# Patient Record
Sex: MALE | Race: BLACK OR AFRICAN AMERICAN | HISPANIC OR LATINO | Employment: UNEMPLOYED | URBAN - METROPOLITAN AREA
[De-identification: names, ages, dates, MRNs, and addresses within clinical notes are randomized per-mention and may not be internally consistent; named-entity substitution may affect disease eponyms.]

---

## 2017-02-20 ENCOUNTER — OFFICE VISIT - HEALTHEAST (OUTPATIENT)
Dept: FAMILY MEDICINE | Facility: CLINIC | Age: 42
End: 2017-02-20

## 2017-02-20 DIAGNOSIS — N40.0 BPH (BENIGN PROSTATIC HYPERPLASIA): ICD-10-CM

## 2017-02-20 DIAGNOSIS — R35.0 URINE FREQUENCY: ICD-10-CM

## 2017-02-20 LAB — PSA SERPL-MCNC: 1.6 NG/ML (ref 0–2.5)

## 2017-02-20 ASSESSMENT — MIFFLIN-ST. JEOR: SCORE: 1745.36

## 2017-03-15 ENCOUNTER — AMBULATORY - HEALTHEAST (OUTPATIENT)
Dept: FAMILY MEDICINE | Facility: CLINIC | Age: 42
End: 2017-03-15

## 2017-03-15 DIAGNOSIS — R31.29 MICROSCOPIC HEMATURIA: ICD-10-CM

## 2017-05-04 ENCOUNTER — COMMUNICATION - HEALTHEAST (OUTPATIENT)
Dept: FAMILY MEDICINE | Facility: CLINIC | Age: 42
End: 2017-05-04

## 2017-08-08 ENCOUNTER — COMMUNICATION - HEALTHEAST (OUTPATIENT)
Dept: FAMILY MEDICINE | Facility: CLINIC | Age: 42
End: 2017-08-08

## 2017-08-29 ENCOUNTER — OFFICE VISIT - HEALTHEAST (OUTPATIENT)
Dept: FAMILY MEDICINE | Facility: CLINIC | Age: 42
End: 2017-08-29

## 2017-08-29 DIAGNOSIS — R35.0 URINARY FREQUENCY: ICD-10-CM

## 2017-08-29 DIAGNOSIS — M54.50 ACUTE BILATERAL LOW BACK PAIN WITHOUT SCIATICA: ICD-10-CM

## 2017-08-29 ASSESSMENT — MIFFLIN-ST. JEOR: SCORE: 1751.03

## 2017-11-10 ENCOUNTER — COMMUNICATION - HEALTHEAST (OUTPATIENT)
Dept: FAMILY MEDICINE | Facility: CLINIC | Age: 42
End: 2017-11-10

## 2018-04-19 ENCOUNTER — RECORDS - HEALTHEAST (OUTPATIENT)
Dept: ADMINISTRATIVE | Facility: OTHER | Age: 43
End: 2018-04-19

## 2018-07-30 ENCOUNTER — OFFICE VISIT - HEALTHEAST (OUTPATIENT)
Dept: FAMILY MEDICINE | Facility: CLINIC | Age: 43
End: 2018-07-30

## 2018-07-30 DIAGNOSIS — R07.89 ATYPICAL CHEST PAIN: ICD-10-CM

## 2018-07-30 DIAGNOSIS — R53.83 FATIGUE, UNSPECIFIED TYPE: ICD-10-CM

## 2018-07-30 DIAGNOSIS — Z00.00 ROUTINE HISTORY AND PHYSICAL EXAMINATION OF ADULT: ICD-10-CM

## 2018-07-30 LAB
ERYTHROCYTE [DISTWIDTH] IN BLOOD BY AUTOMATED COUNT: 12.3 % (ref 11–14.5)
HCT VFR BLD AUTO: 43.3 % (ref 40–54)
HGB BLD-MCNC: 14.7 G/DL (ref 14–18)
MCH RBC QN AUTO: 25.9 PG (ref 27–34)
MCHC RBC AUTO-ENTMCNC: 34 G/DL (ref 32–36)
MCV RBC AUTO: 76 FL (ref 80–100)
PLATELET # BLD AUTO: 300 THOU/UL (ref 140–440)
PMV BLD AUTO: 7.2 FL (ref 7–10)
RBC # BLD AUTO: 5.67 MILL/UL (ref 4.4–6.2)
WBC: 7.7 THOU/UL (ref 4–11)

## 2018-07-30 ASSESSMENT — MIFFLIN-ST. JEOR: SCORE: 1754.43

## 2018-07-31 LAB
ALBUMIN SERPL-MCNC: 4.4 G/DL (ref 3.5–5)
ALP SERPL-CCNC: 86 U/L (ref 45–120)
ALT SERPL W P-5'-P-CCNC: 20 U/L (ref 0–45)
ANION GAP SERPL CALCULATED.3IONS-SCNC: 10 MMOL/L (ref 5–18)
AST SERPL W P-5'-P-CCNC: 16 U/L (ref 0–40)
BILIRUB SERPL-MCNC: 0.4 MG/DL (ref 0–1)
BUN SERPL-MCNC: 23 MG/DL (ref 8–22)
CALCIUM SERPL-MCNC: 10.3 MG/DL (ref 8.5–10.5)
CHLORIDE BLD-SCNC: 106 MMOL/L (ref 98–107)
CHOLEST SERPL-MCNC: 203 MG/DL
CO2 SERPL-SCNC: 25 MMOL/L (ref 22–31)
CREAT SERPL-MCNC: 1.28 MG/DL (ref 0.7–1.3)
FASTING STATUS PATIENT QL REPORTED: YES
GFR SERPL CREATININE-BSD FRML MDRD: >60 ML/MIN/1.73M2
GLUCOSE BLD-MCNC: 96 MG/DL (ref 70–125)
HDLC SERPL-MCNC: 48 MG/DL
LDLC SERPL CALC-MCNC: 115 MG/DL
POTASSIUM BLD-SCNC: 4.3 MMOL/L (ref 3.5–5)
PROT SERPL-MCNC: 7.8 G/DL (ref 6–8)
SODIUM SERPL-SCNC: 141 MMOL/L (ref 136–145)
TRIGL SERPL-MCNC: 200 MG/DL
TSH SERPL DL<=0.005 MIU/L-ACNC: 1.71 UIU/ML (ref 0.3–5)

## 2018-08-01 ENCOUNTER — COMMUNICATION - HEALTHEAST (OUTPATIENT)
Dept: FAMILY MEDICINE | Facility: CLINIC | Age: 43
End: 2018-08-01

## 2018-08-01 LAB — 25(OH)D3 SERPL-MCNC: 19.5 NG/ML (ref 30–80)

## 2018-10-09 ENCOUNTER — RECORDS - HEALTHEAST (OUTPATIENT)
Dept: ADMINISTRATIVE | Facility: OTHER | Age: 43
End: 2018-10-09

## 2019-01-11 ENCOUNTER — COMMUNICATION - HEALTHEAST (OUTPATIENT)
Dept: FAMILY MEDICINE | Facility: CLINIC | Age: 44
End: 2019-01-11

## 2019-02-05 ENCOUNTER — OFFICE VISIT - HEALTHEAST (OUTPATIENT)
Dept: FAMILY MEDICINE | Facility: CLINIC | Age: 44
End: 2019-02-05

## 2019-02-05 DIAGNOSIS — I10 ESSENTIAL HYPERTENSION: ICD-10-CM

## 2019-02-05 DIAGNOSIS — Z76.89 ENCOUNTER TO ESTABLISH CARE: ICD-10-CM

## 2019-02-05 ASSESSMENT — MIFFLIN-ST. JEOR: SCORE: 1754.43

## 2019-04-15 ENCOUNTER — COMMUNICATION - HEALTHEAST (OUTPATIENT)
Dept: FAMILY MEDICINE | Facility: CLINIC | Age: 44
End: 2019-04-15

## 2019-04-15 DIAGNOSIS — I10 ESSENTIAL HYPERTENSION: ICD-10-CM

## 2019-05-23 ENCOUNTER — OFFICE VISIT - HEALTHEAST (OUTPATIENT)
Dept: FAMILY MEDICINE | Facility: CLINIC | Age: 44
End: 2019-05-23

## 2019-05-23 DIAGNOSIS — S43.422A SPRAIN OF LEFT ROTATOR CUFF CAPSULE, INITIAL ENCOUNTER: ICD-10-CM

## 2019-05-23 DIAGNOSIS — I10 ESSENTIAL HYPERTENSION: ICD-10-CM

## 2019-05-23 LAB
CHOLEST SERPL-MCNC: 226 MG/DL
FASTING STATUS PATIENT QL REPORTED: NO
HDLC SERPL-MCNC: 56 MG/DL
LDLC SERPL CALC-MCNC: 151 MG/DL
TRIGL SERPL-MCNC: 93 MG/DL

## 2019-05-23 ASSESSMENT — MIFFLIN-ST. JEOR: SCORE: 1683.56

## 2019-05-29 ENCOUNTER — OFFICE VISIT - HEALTHEAST (OUTPATIENT)
Dept: PHYSICAL THERAPY | Facility: REHABILITATION | Age: 44
End: 2019-05-29

## 2019-05-29 DIAGNOSIS — M25.512 ACUTE PAIN OF LEFT SHOULDER: ICD-10-CM

## 2019-05-29 DIAGNOSIS — R29.3 ABNORMAL POSTURE: ICD-10-CM

## 2019-06-07 ENCOUNTER — OFFICE VISIT - HEALTHEAST (OUTPATIENT)
Dept: PHYSICAL THERAPY | Facility: REHABILITATION | Age: 44
End: 2019-06-07

## 2019-06-07 DIAGNOSIS — M25.512 ACUTE PAIN OF LEFT SHOULDER: ICD-10-CM

## 2019-06-07 DIAGNOSIS — R29.3 ABNORMAL POSTURE: ICD-10-CM

## 2019-06-14 ENCOUNTER — OFFICE VISIT - HEALTHEAST (OUTPATIENT)
Dept: PHYSICAL THERAPY | Facility: REHABILITATION | Age: 44
End: 2019-06-14

## 2019-06-14 DIAGNOSIS — R29.3 ABNORMAL POSTURE: ICD-10-CM

## 2019-06-14 DIAGNOSIS — M25.512 ACUTE PAIN OF LEFT SHOULDER: ICD-10-CM

## 2019-06-28 ENCOUNTER — OFFICE VISIT - HEALTHEAST (OUTPATIENT)
Dept: PHYSICAL THERAPY | Facility: REHABILITATION | Age: 44
End: 2019-06-28

## 2019-06-28 DIAGNOSIS — R29.3 ABNORMAL POSTURE: ICD-10-CM

## 2019-06-28 DIAGNOSIS — M25.512 ACUTE PAIN OF LEFT SHOULDER: ICD-10-CM

## 2020-02-21 ENCOUNTER — COMMUNICATION - HEALTHEAST (OUTPATIENT)
Dept: FAMILY MEDICINE | Facility: CLINIC | Age: 45
End: 2020-02-21

## 2020-02-21 DIAGNOSIS — I10 ESSENTIAL HYPERTENSION: ICD-10-CM

## 2020-04-09 ENCOUNTER — VIRTUAL VISIT (OUTPATIENT)
Dept: FAMILY MEDICINE | Facility: OTHER | Age: 45
End: 2020-04-09

## 2020-04-09 ENCOUNTER — COMMUNICATION - HEALTHEAST (OUTPATIENT)
Dept: SCHEDULING | Facility: CLINIC | Age: 45
End: 2020-04-09

## 2020-04-10 NOTE — PROGRESS NOTES
"Date: 2020 14:18:11  Clinician: Radha Hwang  Clinician NPI: [id number]  Patient: Gume Hauser  Patient : 1975  Patient Address: [address] [place] [id number]  Patient Phone: [phone]  Visit Protocol: URI  Patient Summary:  Gume is a 44 year old ( : 1975 ) male who initiated a Visit for COVID-19 (Coronavirus) evaluation and screening. When asked the question \"Please sign me up to receive news, health information and promotions. \", Gume responded \"No\".    Gume states his symptoms started gradually 3-6 days ago. After his symptoms started, they improved and then got worse again.   His symptoms consist of myalgia, a cough, malaise, chills, and diarrhea.   Symptom details   Cough: Gume coughs a few times an hour and his cough is not more bothersome at night. Phlegm does not come into his throat when he coughs. He does not believe his cough is caused by post-nasal drip.    Gume denies having rhinitis, facial pain or pressure, sore throat, nasal congestion, ear pain, vomiting, nausea, teeth pain, headache, fever, and wheezing. He also denies taking antibiotic medication for the symptoms and having recent facial or sinus surgery in the past 60 days. He is not experiencing dyspnea.   Precipitating events  He has not recently been exposed to someone with influenza. Gume has not been in close contact with any high risk individuals.   Pertinent COVID-19 (Coronavirus) information  Gume has not traveled internationally or to the areas where COVID-19 (Coronavirus) is widespread, including cruise ship travel in the last 14 days before the start of his symptoms.   Gume has not had a close contact with a laboratory-confirmed COVID-19 patient within 14 days of symptom onset. He also has not had a close contact with a suspected COVID-19 patient within 14 days of symptom onset.   Gume does not work or volunteer as " healthcare worker or a  and does not work or volunteer in a healthcare facility. He does not live with a healthcare worker.   Pertinent medical history  Gume needs a return to work/school note.   Weight: 185 lbs   Gume does not smoke or use smokeless tobacco.   Weight: 185 lbs    MEDICATIONS: amlodipine besylate (bulk), ALLERGIES: NKDA  Clinician Response:  Dear Gume,   Based on the information you have provided, you do have symptoms that are consistent with Coronavirus (COVID-19).  The coronavirus causes mild to severe respiratory illness with the most common symptoms including fever, cough and difficulty breathing. Unfortunately, many viruses cause similar symptoms and it can be difficult to distinguish between viruses, especially in mild cases, so we are presuming that anyone with cough or fever has coronavirus at this time.  Coronavirus/COVID-19 has reached the point of community spread in [place], meaning that we are finding the virus in people with no known exposure risk for franchesca the virus. Given the increasing commonness of coronavirus in the community we are no longer testing patients who are not critically ill.  If you are a health care worker, you should refer to your employee health office for instructions about testing and returning to work.  For everyone else who has cough or fever, you should assume you are infected with coronavirus. Since you will not be tested but have symptoms that may be consistent with coronavirus, the CDC recommends you stay in self-isolation until these three things have happened:    You have had no fever for at least 72 hours (that is three full days of no fever without the use of medicine that reduces fevers)    AND   Other symptoms have improved (for example, when your cough or shortness of breath have improved)   AND   At least 7 days have passed since your symptoms first appeared.   How to Isolate:   Isolate yourself at home.  Do  Not allow any visitors  Do Not go to work or school  Do Not go to Bahai,  centers, shopping, or other public places.  Do Not shake hands.  Avoid close contact with others (hugging, kissing).   Protect Others:   Cover Your Mouth and Nose with a mask, disposable tissue or wash cloth to avoid spreading germs to others.  Wash your hands and face frequently with soap and water.   We know it can be scary to hear that you might have COVID-19. Our team can help track your symptoms and make sure you are doing ok over the next two weeks using a program called [employer] to keep in touch. When you receive an email from [employer], please consider enrolling in our monitoring program. There is no cost to you for monitoring. Here is a URL where you can learn more: [email or web address]  Managing Symptoms:   At this time, we primarily recommend Tylenol (Acetaminophen) for fever or pain. If you have liver or kidney problems, contact your primary care provider for instructions on use of tylenol. Adults can take 650 mg (two 325 mg pills) by mouth every 4-6 hours as needed OR 1,000 mg (two 500 mg pills) every 8 hours as needed. MAXIMUM DAILY DOSE: 3,000mg. For children, refer to dosing on bottle based on age or weight.   If you develop significant shortness of breath that prevents you from doing normal activities, please call 911 or proceed to the nearest emergency room and alert them immediately that you have been in self-isolation for possible coronavirus.  If you have a higher risk medical condition such as cancer, heart failure, end stage renal disease on dialysis or have a transplant, please reach out to your specialist's clinic to advise them of your [facility] visit should you not improve within the next two days.   For more information about COVID19 and options for caring for yourself at home, please visit the CDC website at https://www.cdc.gov/coronavirus/2019-ncov/about/steps-when-sick.htmlFor more  options for care at [facility], please visit our website at [email or web address]    Diagnosis: Cough  Diagnosis ICD: R05  Addendum created: April 09 17:05:14, 2020 created by: Nick Craft body: I reviewed the e-visit and discussed the patient with the resident and agree with the resident's assessment and plan of care as documented.

## 2020-05-18 ENCOUNTER — COMMUNICATION - HEALTHEAST (OUTPATIENT)
Dept: SCHEDULING | Facility: CLINIC | Age: 45
End: 2020-05-18

## 2020-05-18 ENCOUNTER — RECORDS - HEALTHEAST (OUTPATIENT)
Dept: ADMINISTRATIVE | Facility: OTHER | Age: 45
End: 2020-05-18

## 2020-05-19 ENCOUNTER — COMMUNICATION - HEALTHEAST (OUTPATIENT)
Dept: SCHEDULING | Facility: CLINIC | Age: 45
End: 2020-05-19

## 2020-05-21 ENCOUNTER — COMMUNICATION - HEALTHEAST (OUTPATIENT)
Dept: FAMILY MEDICINE | Facility: CLINIC | Age: 45
End: 2020-05-21

## 2020-05-21 ENCOUNTER — RECORDS - HEALTHEAST (OUTPATIENT)
Dept: ADMINISTRATIVE | Facility: OTHER | Age: 45
End: 2020-05-21

## 2020-05-21 DIAGNOSIS — I10 ESSENTIAL HYPERTENSION: ICD-10-CM

## 2020-05-22 ENCOUNTER — RECORDS - HEALTHEAST (OUTPATIENT)
Dept: ADMINISTRATIVE | Facility: OTHER | Age: 45
End: 2020-05-22

## 2020-05-29 ENCOUNTER — OFFICE VISIT - HEALTHEAST (OUTPATIENT)
Dept: FAMILY MEDICINE | Facility: CLINIC | Age: 45
End: 2020-05-29

## 2020-05-29 DIAGNOSIS — I10 ESSENTIAL HYPERTENSION: ICD-10-CM

## 2020-05-29 DIAGNOSIS — R07.89 CHEST WALL PAIN: ICD-10-CM

## 2020-05-29 DIAGNOSIS — M53.3 COCCYDYNIA: ICD-10-CM

## 2020-06-02 ENCOUNTER — COMMUNICATION - HEALTHEAST (OUTPATIENT)
Dept: FAMILY MEDICINE | Facility: CLINIC | Age: 45
End: 2020-06-02

## 2020-07-26 ENCOUNTER — COMMUNICATION - HEALTHEAST (OUTPATIENT)
Dept: FAMILY MEDICINE | Facility: CLINIC | Age: 45
End: 2020-07-26

## 2020-07-26 DIAGNOSIS — I10 ESSENTIAL HYPERTENSION: ICD-10-CM

## 2020-07-29 ENCOUNTER — OFFICE VISIT - HEALTHEAST (OUTPATIENT)
Dept: FAMILY MEDICINE | Facility: CLINIC | Age: 45
End: 2020-07-29

## 2020-07-29 DIAGNOSIS — R00.0 TACHYCARDIA: ICD-10-CM

## 2020-08-14 ENCOUNTER — HOSPITAL ENCOUNTER (OUTPATIENT)
Dept: CARDIOLOGY | Facility: CLINIC | Age: 45
Discharge: HOME | End: 2020-08-14
Attending: FAMILY MEDICINE

## 2020-08-14 DIAGNOSIS — R00.0 TACHYCARDIA: ICD-10-CM

## 2021-01-21 ENCOUNTER — COMMUNICATION - HEALTHEAST (OUTPATIENT)
Dept: FAMILY MEDICINE | Facility: CLINIC | Age: 46
End: 2021-01-21

## 2021-01-21 DIAGNOSIS — I10 ESSENTIAL HYPERTENSION: ICD-10-CM

## 2021-04-25 ENCOUNTER — COMMUNICATION - HEALTHEAST (OUTPATIENT)
Dept: FAMILY MEDICINE | Facility: CLINIC | Age: 46
End: 2021-04-25

## 2021-04-25 DIAGNOSIS — I10 ESSENTIAL HYPERTENSION: ICD-10-CM

## 2021-05-06 ENCOUNTER — COMMUNICATION - HEALTHEAST (OUTPATIENT)
Dept: ADMINISTRATIVE | Facility: CLINIC | Age: 46
End: 2021-05-06

## 2021-05-06 DIAGNOSIS — I10 ESSENTIAL HYPERTENSION: ICD-10-CM

## 2021-05-07 RX ORDER — AMLODIPINE BESYLATE 10 MG/1
10 TABLET ORAL DAILY
Qty: 90 TABLET | Refills: 3 | Status: SHIPPED | OUTPATIENT
Start: 2021-05-07 | End: 2021-07-06

## 2021-05-19 ENCOUNTER — OFFICE VISIT - HEALTHEAST (OUTPATIENT)
Dept: FAMILY MEDICINE | Facility: CLINIC | Age: 46
End: 2021-05-19

## 2021-05-19 DIAGNOSIS — J02.0 STREP THROAT: ICD-10-CM

## 2021-05-19 LAB — DEPRECATED S PYO AG THROAT QL EIA: ABNORMAL

## 2021-05-27 VITALS
SYSTOLIC BLOOD PRESSURE: 122 MMHG | TEMPERATURE: 101.9 F | DIASTOLIC BLOOD PRESSURE: 60 MMHG | RESPIRATION RATE: 14 BRPM | HEART RATE: 101 BPM | OXYGEN SATURATION: 93 %

## 2021-05-27 NOTE — TELEPHONE ENCOUNTER
Refill Approved    Rx renewed per Medication Renewal Policy. Medication was last renewed on 1/14/19.    Ly Mares, [facility] Triage/Med Refill 4/17/2019     Requested Prescriptions   Pending Prescriptions Disp Refills     amLODIPine (NORVASC) 10 MG tablet [Pharmacy Med Name: AMLODIPINE BESYLATE 10MG TABLETS] 90 tablet 0     Sig: TAKE ONE TABLET BY MOUTH DAILY       Calcium-Channel Blockers Protocol Passed - 4/15/2019  9:24 AM        Passed - PCP or prescribing provider visit in past 12 months or next 3 months     Last office visit with prescriber/PCP: Visit date not found OR same dept: 2/5/2019 Ketan Patel MD OR same specialty: 2/5/2019 Ketan Patel MD  Last physical: Visit date not found Last MTM visit: Visit date not found   Next visit within 3 mo: Visit date not found  Next physical within 3 mo: Visit date not found  Prescriber OR PCP: Shi Somers MD  Last diagnosis associated with med order: There are no diagnoses linked to this encounter.  If protocol passes may refill for 12 months if within 3 months of last provider visit (or a total of 15 months).             Passed - Blood pressure filed in past 12 months     BP Readings from Last 1 Encounters:   02/05/19 130/86

## 2021-05-29 NOTE — PROGRESS NOTES
[facility] Daily Progress     Patient Name: Gume Hauser  Date: 2019  Visit #: 3  Referral Diagnosis: L shoulder pain  Referring provider: Ketan Patel MD  Visit Diagnosis:     ICD-10-CM    1. Acute pain of left shoulder M25.512    2. Abnormal posture R29.3          Assessment:   Patient presenting with improving L shoulder pain and good toleration for HEP. Patient continued with the manual therapy to address tension in the L bicep and pec major.  PT to consider independent hoe trial following next appointment if patient continues to improve at this rate.    HEP/POC compliance is  good .  Patient is appropriate to continue with skilled physical therapy intervention, as indicated by initial plan of care.    Goal Status:  Pt. will be independent with home exercise program in : 4 weeks    Pt will: be able to assume R SL without increased L shoulder pain in 8 weeks  Pt will: reduce mild soreness to 0/10 post work shift in 8 weeks  Pt will: reduce SPADI by 30% in 8 weeks      Plan / Patient Education:     Continue with initial plan of care.  Progress with home program as tolerated.    PLAN for next visit: review GB GH ext exercise, review HEP, MT, possible last appointment  Subjective:     Pain Ratin    The patient reports that he has been doing well with the HEP and has been doing better even with a lot going on recently (Graduations, party, family events, etc.) His shoulder has not been bothering him. He reports that he is comfortable doing a follow-up visit in 2 weeks and assess the plan at that point.       Objective:   PT instructed the patient on Green band shoulder extension exercise  Using tactile and verbal cues. PT performed MT (see flowsheet).    Exercises:  Exercise #1: scapular retraction x 10 hold 5 seconds  Comment #1: L GH isometrics 1) flexion 2) IR x 10 hold 5 seconds  Exercise #2: Standing unilateral pec stretch against wall, hold 30 seconds per side  Comment #2: Greenband  shoulder extension, x10-30, 1x per day    ROM:  L Shoulder Flexion: 168 degrees (was 155)  Treatment Today     TREATMENT MINUTES COMMENTS   Evaluation     Self-care/ Home management     Manual therapy 17 Patient positioned in supine- MFR layer III to L bicep and L subscapularis and L pec major and L UT   Neuromuscular Re-education     Therapeutic Activity     Therapeutic Exercises 8 See flow sheet  Added GB GH extension   Gait training     Modality__________________                Total 25    Blank areas are intentional and mean the treatment did not include these items.     PT  present for and directed all treatment and not engaged in treating other patients for the duration of this appointment.    Savita Thapa, SPT  Jenny Wahl  6/14/2019

## 2021-05-29 NOTE — PROGRESS NOTES
[facility] Daily Progress     Patient Name: Gume Hauser  Date: 2019  Visit #: 2  Referral Diagnosis: L shoulder pain  Referring provider: Ketan Patel MD  Visit Diagnosis:     ICD-10-CM    1. Acute pain of left shoulder M25.512    2. Abnormal posture R29.3          Assessment:   Patient presenting with improving L shoulder pain and good toleration for HEP. Patient continued with the manual therapy to address tension in the L bicep and pec major.    HEP/POC compliance is  good .  Patient is appropriate to continue with skilled physical therapy intervention, as indicated by initial plan of care.    Goal Status:  Pt. will be independent with home exercise program in : 4 weeks    Pt will: be able to assume R SL without increased L shoulder pain in 8 weeks  Pt will: reduce mild soreness to 0/10 post work shift in 8 weeks  Pt will: reduce SPADI by 30% in 8 weeks      Plan / Patient Education:     Continue with initial plan of care.  Progress with home program as tolerated.    PLAN for next visit: add resistance to scapular retraction exercise,   Subjective:     Pain Ratin    The patient reports that he missed one day of exercise but has been pretty consistent with the HEP. He has been very busy at work and home; his daughter is graduating today and they have a party this weekend. Yesterday he did a lot of physical work and said it went well.      Objective:     Exercises:  Exercise #1: scapular retraction x 10 hold 5 seconds  Comment #1: L GH isometrics 1) flexion 2) IR x 10 hold 5 seconds  Exercise #2: Standing unilateral pec stretch against wall, hold 30 seconds per side    ROM:  L Shoulder Flexion: 168 degrees (was 155)  Treatment Today     TREATMENT MINUTES COMMENTS   Evaluation     Self-care/ Home management     Manual therapy 15 Patient positioned in supine- MFR layer III to L bicep and L subscapularis and L pec major and L UT   Neuromuscular Re-education     Therapeutic Activity      Therapeutic Exercises 10 See flow sheet   Gait training     Modality__________________                Total 25    Blank areas are intentional and mean the treatment did not include these items.     Savita Thapa, [facility]  Jenny Wahl  6/7/2019

## 2021-05-29 NOTE — PROGRESS NOTES
ASSESSMENT AND PLAN:  1. Essential hypertension  Blood pressure well controlled taking amlodipine check a lipid panel today.  - Lipid Cascade    2. Sprain of left rotator cuff capsule, initial encounter    He has had left rotator cuff discomfort.  He will purchase Biofreeze Celebrex started he will go to physical therapy.  - Ambulatory referral to Adult PT- External  - celecoxib (CELEBREX) 200 MG capsule; Take 1 capsule (200 mg total) by mouth daily.  Dispense: 30 capsule; Refill: 2            Orders Placed This Encounter   Procedures     Lipid Cascade     Order Specific Question:   Fasting is required?     Answer:   No     Ambulatory referral to Adult PT- External     Referral Priority:   Routine     Referral Type:   Physical Therapy     Referral Reason:   Evaluation and Treatment     Requested Specialty:   Physical Therapy     Number of Visits Requested:   1     There are no discontinued medications.    No follow-ups on file.    CHIEF COMPLAINT:  Chief Complaint   Patient presents with     Pain     Left arm and shoulder.  feels tingling       HISTORY OF PRESENT ILLNESS:  Gume is a 43 y.o. male who presents to the clinic today for left shoulder pain. Onset was sudden without inciting event. There was no associated trauma or injury. There is some tingling involved. It is exacerbated with certain positions. It hurts to sleep on his left side. He is left-handed, and does not do any lifting at work. The patient denies any chest pain or discomfort. He has not come in fasting today.     REVIEW OF SYSTEMS:   CV: No chest pain or discomfort.  Musculoskeletal: Left shoulder pain.  Neuro: Tingling in left shoulder.   All other systems are negative.    PFSH:  His job does not require any lifting. Reviewed as below.     TOBACCO USE:  Social History     Tobacco Use   Smoking Status Never Smoker   Smokeless Tobacco Never Used       VITALS:  Vitals:    05/23/19 1427   BP: 134/82   Patient Site: Left Arm   Patient Position:  "Sitting   Cuff Size: Adult Regular   Pulse: 86   Resp: 18   Temp: 98.3  F (36.8  C)   TempSrc: Oral   SpO2: 98%   Weight: 187 lb 6 oz (85 kg)   Height: 5' 7\" (1.702 m)     Wt Readings from Last 3 Encounters:   05/23/19 187 lb 6 oz (85 kg)   02/05/19 203 lb (92.1 kg)   07/30/18 203 lb (92.1 kg)     Body mass index is 29.35 kg/m .      PHYSICAL EXAM:  General: Alert, cooperative, no distress, appears stated age  HEENT: Normocephalic, without obvious abnormality, atraumatic, moist mucous membranes   Eyes: PERRL, conjunctiva/cornea clear, EOM's intact  Lungs: Clear to auscultation bilaterally, respirations unlabored  Heart: Regular rate and rhythm, S1 and S2 normal, no murmur, rub, or gallop  Musculoskeletal: On evaluation of the left shoulder, he is tender to palpation over the tendon of the pectoralis major and teres minor. Positive Neer's.  Neurologic:  A & O x 3.  No tremor, no focal findings.  Strength testing is normal and symetric both proximally and distally BUE.  Normal gait.     DATA REVIEWED:  Additional History from Old Records Summarized (2): none  Decision to Obtain Records (1): none  Radiology Tests Summarized or Ordered (1): none  Labs Reviewed or Ordered (1): Lipid cascade ordered.  Medicine Test Summarized or Ordered (1): none  Independent Review of EKG or X-RAY(2 each): none      ITerra, am scribing for and in the presence of, Dr. Patel.    IDr. Patel, personally performed the services described in this documentation, as scribed by Terra Kwan in my presence, and it is both accurate and complete.      MEDICATIONS:  Current Outpatient Medications   Medication Sig Dispense Refill     amLODIPine (NORVASC) 10 MG tablet TAKE ONE TABLET BY MOUTH DAILY 90 tablet 2     celecoxib (CELEBREX) 200 MG capsule Take 1 capsule (200 mg total) by mouth daily. 30 capsule 2     No current facility-administered medications for this visit.      Total amount time spent the patient today was 25 minutes greater than " 50% of this time was spent discussing the pathophysiology of rotator cuff injuries and impingement.  Total Data Points: 1    Please note that this clinical encounter uses voice recognition software, there may be typographical errors present

## 2021-05-29 NOTE — PROGRESS NOTES
[facility]   Shoulder Initial Evaluation    Patient Name: Gume Hauser  Date of evaluation: 5/29/2019  Referral Diagnosis: Sprain of left rotator cuff capsule, initial encounter  Referring provider: Ketan Patel MD  Visit Diagnosis:     ICD-10-CM    1. Acute pain of left shoulder M25.512    2. Abnormal posture R29.3        Assessment:    Patient is presenting with insidious onset L shoulder pain that began two weeks ago, 5/2019. He notes some soreness (mild) at the end of his day after lifting 10-40# throughout the day. He denies any previous history of injury. PT exam reveals positive Delaney-Reos, irritation with L bicep and L subscapularis testing. PT educated the patient on findings and PT treatment and proceed with  UR mobilization. PT also initiated HEP to address deficits. PT established goals in agreement with the patient.     Pt. is appropriate for skilled PT intervention as outlined in the Plan of Care (POC).  Pt. is a good candidate for skilled PT services to improve pain levels and function.    Goals:  Pt. will be independent with home exercise program in : 4 weeks    Pt will: be able to assume R SL without increased L shoulder pain in 8 weeks  Pt will: reduce mild soreness to 0/10 post work shift in 8 weeks  Pt will: reduce SPADI by 30% in 8 weeks      Patient's expectations/goals are realistic.    Barriers to Learning or Achieving Goals:  No Barriers.       Plan / Patient Instructions:        Plan of Care:   Communication with: Referral Source  Patient Related Instruction: Nature of Condition;Treatment plan and rationale;Posture  Times per Week: 1  Number of Weeks: 12  Number of Visits: 12  Discharge Planning: to independent home program and self-care  Therapeutic Exercise: Strengthening;Stretching;ROM  Neuromuscular Reeducation: posture;kinesio tape  Manual Therapy: soft tissue mobilization;myofascial release;joint mobilization  Modalities: TENS      POC and pathology of condition  were reviewed with patient.  Pt. is in agreement with the Plan of Care  A Home Exercise Program (HEP) was initiated today.  Plan for next visit: assess response to manual therapy, unilateral pectoral stretch  .   Subjective:         History of Present Illness:    Gume is a 43 y.o. male who presents to therapy today with complaints of L shoulder pain that's started 2 weeks ago insidiously. He noted increased pain with helping a friend to move  He notes it to worsen a little with work the more he does. He reports some discomfort with lying on his side at night.   Daily he lifts about 10-40#.  He has a 2# and 5#.     Better with: rest  Worse with: Lying on side, rep lifting at work.    Pain Ratin  Pain rating at best: 0  Pain rating at worst: 6 after lifting  Pain description: aching and soreness    Functional limitations are described as occurring with:   lifting    Patient reports benefit from:  rest         Objective:      Note: Items left blank indicates the item was not performed or not indicated at the time of the evaluation.    Patient Outcome Measures :    Shoulder Pain and Disability Index (SPADI) in %: 16     Scores range from 0-100%, where a score of 0% represents minimal pain and maximal function. The minimal clinically important difference is a score reduction of 10%.    Shoulder Examination  1. Acute pain of left shoulder     2. Abnormal posture       Involved side: Left  Posture Observation:      Shoulder/Thoracic complex: Moderate bilateral scapular protraction   Cervical Clearing: Normal    Shoulder/Elbow ROM    Date: 19     Shoulder and Elbow ROM ( )   AROM in degrees AROM in degrees AROM in degrees    Right Left Right Left Right Left   Shoulder Flexion (0-180 ) 178 155       Shoulder Abduction (0-180 ) 180 WNL       Shoulder Extension (0-60 )         Shoulder ER (0-90 ) WNL WNL       Shoulder IR (0-70 )         Shoulder IR/Ext Thumb to T3 Thumb to T 12       Elbow Flexion (150 )          Elbow Extension (0 )           Shoulder/Elbow Strength   Date: 5/29/19     Shoulder/Elbow Strength (/5)  Manual Muscle Test (MMT) MMT MMT MMT    Right Left Right Left Right Left   Shoulder Flexion 5 5       Supraspinatus 5 4+       Shoulder Abduction 5 4+ p       Shoulder Extension         Shoulder External Rotation 5 5       Shoulder Internal Rotation 5 4- p       Elbow Flexion 5 5       Elbow Extension 5 5       Other:         Other:           Flexibility:     Palpation: L bicep    Joint Assessment:  Sternoclavicular Joint Assessment: WNL  Acromioclavicular Joint Assessment: Hypomobile   Scapulothoracic Joint Assessment: Hypomobile.  Glenohumeral Joint Assessment:Hypomobile.    Shoulder Special Tests     Impingement Cluster Right (+/-) Left (+/-) Rotator Cuff Tests Right (+/-) Left (+/-)   Delaney-Eros  + Drop Arm Sign  -   Painful Arc  - Hornblowers     Infraspinatus Test  - ERLS     AC Tests Right (+/-) Left (+/-) IRLS     Active Compression  + Labral Tests Right (+/-) Left (+/-)   Crossbody Adduction   Biceps Load Test II  +   AC Resisted Extension   Jerk Test     GH Instability Tests Right (+/-) Left (+/-) Jolly Test     Sulcus Sign   Biceps Tests Right (+/-) Left (+/-)   Apprehension   Speed  -   Relocation   Mar hoyos     Other:   Other:     Other:   Other:         Treatment Today    TREATMENT MINUTES COMMENTS   Evaluation 20 Low complexity   Self-care/ Home management     Manual therapy 15 Patient positioned in supine- MFR layer III to L bicep and L subscapularis and L pec major   Neuromuscular Re-education     Therapeutic Activity     Therapeutic Exercises 15 See flow sheet  PT educated patient on eval findings and PT POC.    Gait training     Modality__________________                Total 50    Blank areas are intentional and mean the treatment did not include these items.     PT Evaluation Code: (Please list factors)  Patient History/Comorbidities:   Patient Active Problem List   Diagnosis     Joint  Pain, Localized In The Hip     Tingling (Paresthesia)     Limb Pain     Bladder Pain     Microscopic Hematuria     Hypertension       Examination: shoulder  Clinical Presentation: stable  Clinical Decision Making: low    Patient History/  Comorbidities Examination  (body structures and functions, activity limitations, and/or participation restrictions) Clinical Presentation Clinical Decision Making (Complexity)   No documented Comorbidities or personal factors 1-2 Elements Stable and/or uncomplicated Low   1-2 documented comorbidities or personal factor 3 Elements Evolving clinical presentation with changing characteristics Moderate   3-4 documented comorbidities or personal factors 4 or more Unstable and unpredictable High                Jenny Wahl  5/29/2019  11:45 AM

## 2021-05-30 VITALS — HEIGHT: 67 IN | WEIGHT: 201 LBS | BODY MASS INDEX: 31.55 KG/M2

## 2021-05-30 NOTE — PROGRESS NOTES
[facility] Discharge Summary  Patient Name: Gume Hauser  Date: 10/18/2019  Referral Diagnosis: L shoulder pain  Referring provider: Ketan Patel MD  Visit Diagnosis:   1. Acute pain of left shoulder     2. Abnormal posture         Goals:  No data recorded  No data recorded    Patient was seen for 4 visits from 5/29/19 to 6/28/19 with 0 missed appointments.  The patient met goals and has demonstrated understanding of and independence in the home program for self-care, and progression to next steps.  He will initiate contact if questions or concerns arise.    Therapy will be discontinued at this time.  The patient will need a new referral to resume.    Thank you for your referral.  Jenny Wahl  10/18/2019  9:22 AM  [facility] Daily Progress     Patient Name: Gume Hauser  Date: 6/28/2019  Visit #: 4  Referral Diagnosis: L shoulder pain  Referring provider: Ketan Patel MD  Visit Diagnosis:     ICD-10-CM    1. Acute pain of left shoulder M25.512    2. Abnormal posture R29.3          Assessment:   Patient is presenting with 0/10 L shoulder pain and complete achievement of goals status. PT reviewed SPADI and special testing of the L UE. His SPADI score has improved from 16% down to 0%. Patient and PT both agree to an independent home trial. PT explained DC process and instructed the patient to call if he has any questions.     HEP/POC compliance is  good .  Patient is appropriate to continue with skilled physical therapy intervention, as indicated by initial plan of care.    Goal Status:  Pt. will be independent with home exercise program in : 4 weeks;Met    Pt will: be able to assume R SL without increased L shoulder pain in 8 weeks- Met  Pt will: reduce mild soreness to 0/10 post work shift in 8 weeks- Met  Pt will: reduce SPADI by 30% in 8 weeks- Met      Plan / Patient Education:     Continue with initial plan of care.  Progress with home program as tolerated.    PLAN for next  visit: review HEP, MT, possible last appointment  Subjective:     Pain Ratin    The patient reports that he has been doing very well. He recently just got back from vacation in [place] and the shoulder did not bother him at all during vacation. He is able to sleep on it too and it does not bother him. He was a little sore after the manual therapy last time but it only lasted that night and it was gone by the next morning.    Objective:   PT instructed the patient on wall push-up using tactile and verbal cues and added it to the HEP. PT recommended he continue doing the exercises through the month of July then to do as he deems necessary. PT and patient discussed and agreed discharge. PT performed MT (see flowsheet).    Exercises:  Exercise #1: scapular retraction x 10 hold 5 seconds  Comment #1: L GH isometrics 1) flexion 2) IR x 10 hold 5 seconds  Exercise #2: Standing unilateral pec stretch against wall, hold 30 seconds per side  Comment #2: [place] shoulder extension, x10-30, 1x per day (verbally reviewed)  Exercise #3: Wall push up ,10-30x, 1x per day    ROM:  L Shoulder Flexion: 180 degrees (was 155)  Bicep load test is now negative and supraspinatus MMT on the L is no longer painful.    Treatment Today     TREATMENT MINUTES COMMENTS   Evaluation     Self-care/ Home management     Manual therapy 15 Patient positioned in supine- MFR layer III to L bicep and L subscapularis and L pec major and L UT   Neuromuscular Re-education     Therapeutic Activity     Therapeutic Exercises 12 See flow sheet  Added wall push-up.   Gait training     Modality__________________                Total 27    Blank areas are intentional and mean the treatment did not include these items.     PT  present for and directed all treatment and not engaged in treating other patients for the duration of this appointment.    Savita Thapa, SPT  Jenny Wahl  2019

## 2021-05-31 VITALS — BODY MASS INDEX: 31.74 KG/M2 | HEIGHT: 67 IN | WEIGHT: 202.25 LBS

## 2021-06-01 VITALS — WEIGHT: 203 LBS | BODY MASS INDEX: 31.86 KG/M2 | HEIGHT: 67 IN

## 2021-06-02 VITALS — BODY MASS INDEX: 31.86 KG/M2 | HEIGHT: 67 IN | WEIGHT: 203 LBS

## 2021-06-03 VITALS — BODY MASS INDEX: 29.41 KG/M2 | WEIGHT: 187.38 LBS | HEIGHT: 67 IN

## 2021-06-06 NOTE — TELEPHONE ENCOUNTER
Refill Approved    Rx renewed per Medication Renewal Policy. Medication was last renewed on 4/17/19.    Ly Mares, [facility] Connection Triage/Med Refill 2/24/2020     Requested Prescriptions   Pending Prescriptions Disp Refills     amLODIPine (NORVASC) 10 MG tablet [Pharmacy Med Name: AMLODIPINE BESYLATE 10MG TABLETS] 90 tablet 2     Sig: TAKE 1 TABLET BY MOUTH EVERY DAY       Calcium-Channel Blockers Protocol Passed - 2/21/2020 11:23 AM        Passed - PCP or prescribing provider visit in past 12 months or next 3 months     Last office visit with prescriber/PCP: Visit date not found OR same dept: 5/23/2019 Ketan Patel MD OR same specialty: 5/23/2019 Ketan Patel MD  Last physical: Visit date not found Last MTM visit: Visit date not found   Next visit within 3 mo: Visit date not found  Next physical within 3 mo: Visit date not found  Prescriber OR PCP: Shi Somers MD  Last diagnosis associated with med order: 1. Essential hypertension  - amLODIPine (NORVASC) 10 MG tablet [Pharmacy Med Name: AMLODIPINE BESYLATE 10MG TABLETS]; TAKE 1 TABLET BY MOUTH EVERY DAY  Dispense: 90 tablet; Refill: 2    If protocol passes may refill for 12 months if within 3 months of last provider visit (or a total of 15 months).             Passed - Blood pressure filed in past 12 months     BP Readings from Last 1 Encounters:   05/23/19 134/82

## 2021-06-07 NOTE — TELEPHONE ENCOUNTER
Sunday so tired arms tired, no shortness of breath climbing stairs. No fever. Cough dry. Fatigue is worse, no chest  Pain but chest tightness.   Tayler Trevizo  COVID 19 Nurse Triage Plan/Patient Instructions    Please be aware that novel coronavirus (COVID-19) may be circulating in the community. If you develop symptoms such as fever, cough, or SOB or if you have concerns about the presence of another infection including coronavirus (COVID-19), please contact your health care provider or visit [email or web address].     Disposition/Instructions    Patient to have an OnCare Visit with a provider (Preferred option). Follow System Ambulatory Workflow for COVID 19. It is recommended that you setup an OnCare Visit with one of our virtual providers.  To do this follow these instructions:    1. Go to the website [email or web address]  2. Create an account (you will need your insurance information)  3. Start a new visit  4. Choose your diagnosis (e.g. COVID19)  5. Fill out the information about your symptoms  6. A provider will reach out to you by text, phone call or video visit based on your request    Call Back If: Your symptoms worsen before you are able to complete your OnCare Visit with a provider.    Thank you for limiting contact with others, wearing a simple mask to cover your cough, practice good hand hygiene habits and accessing our virtual services where possible to limit the spread of this virus.    For more information about COVID19 and options for caring for yourself at home, please visit the CDC website at https://www.cdc.gov/coronavirus/2019-ncov/about/steps-when-sick.html  For more options for care at [facility], please visit our website at [email or web address]    For more information, please use the [place] Department of Health ([facility]) COVID-19 Hotlines (Interpreters available):     Health questions: Phone Number: [phone] or [phone] and Hours: 7 a.m. to 7 p.m.    Schools  and  questions: Phone Number: [phone] or [phone] and Hours 7 a.m. to 7 p.m.

## 2021-06-08 NOTE — TELEPHONE ENCOUNTER
Due to be seen    Rx renewed per Medication Renewal Policy. Medication was last renewed on 2/24/20.    Ly Mares, [facility] Connection Triage/Med Refill 5/26/2020     Requested Prescriptions   Pending Prescriptions Disp Refills     amLODIPine (NORVASC) 10 MG tablet [Pharmacy Med Name: AMLODIPINE BESYLATE 10MG TABLETS] 90 tablet 0     Sig: TAKE 1 TABLET BY MOUTH EVERY DAY       Calcium-Channel Blockers Protocol Passed - 5/21/2020 12:11 PM        Passed - PCP or prescribing provider visit in past 12 months or next 3 months     Last office visit with prescriber/PCP: Visit date not found OR same dept: 5/23/2019 Ketan Patel MD OR same specialty: 5/23/2019 Ketan Patel MD  Last physical: Visit date not found Last MTM visit: Visit date not found   Next visit within 3 mo: Visit date not found  Next physical within 3 mo: Visit date not found  Prescriber OR PCP: Shi Somers MD  Last diagnosis associated with med order: 1. Essential hypertension  - amLODIPine (NORVASC) 10 MG tablet [Pharmacy Med Name: AMLODIPINE BESYLATE 10MG TABLETS]; TAKE 1 TABLET BY MOUTH EVERY DAY  Dispense: 90 tablet; Refill: 0    If protocol passes may refill for 12 months if within 3 months of last provider visit (or a total of 15 months).             Passed - Blood pressure filed in past 12 months     BP Readings from Last 1 Encounters:   05/23/19 134/82

## 2021-06-08 NOTE — TELEPHONE ENCOUNTER
Son possibly has COVID-19, did on-care and was advised to self quarantine. Last exposure on Thursday, his sons symptoms began on Saturday.   Pt is wondering if he needs to quarantine due to possible exposure to COVID 19.    RN advised patient of recommendations per care advice/protocol.     Pt stated understanding and agreed to plan.     Bettina Hensley, RN   [facility] RN Triage      Reason for Disposition    [1] COVID-19 EXPOSURE (Close Contact) within last 14 days AND [2] NO cough, fever, or breathing difficulty    Protocols used: CORONAVIRUS (COVID-19) EXPOSURE-A- 4.22.20

## 2021-06-08 NOTE — TELEPHONE ENCOUNTER
RN Triage  Rolando is calling today because Saturday night he felt some indigestion symptoms into his chest. 3-4/10. He can still feel some of this now. He has had angina in the past. Never had a heart attack. History of hypertension.    I advised Rolando initially that he should call 911, he would prefer to go to ER or UR. He understands to call EMS if his symptoms worsen at all.      Reason for Disposition    Chest pain lasting longer than 5 minutes and ANY of the following:* Over 50 years old* Over 30 years old and at least one cardiac risk factor (i.e., high blood pressure, diabetes, high cholesterol, obesity, smoker or strong family history of heart disease)* Pain is crushing, pressure-like, or heavy * Took nitroglycerin and chest pain was not relieved* History of heart disease (i.e., angina, heart attack, bypass surgery, angioplasty, CHF)    Protocols used: CHEST PAIN-A-[place]    Sabra Zayas RN  [facility] Nurse Advisor    COVID 19 Nurse Triage Plan/Patient Instructions    Please be aware that novel coronavirus (COVID-19) may be circulating in the community. If you develop symptoms such as fever, cough, or SOB or if you have concerns about the presence of another infection including coronavirus (COVID-19), please contact your health care provider or visit [email or web address].     Disposition/Instructions    Patient to go to ED and follow protocol based instructions. Follow System Ambulatory Workflow for COVID 19.     Bring Your Own Device:  Please also bring your smart device(s) (smart phones, tablets, laptops) and their charging cables for your personal use and to communicate with your care team during your visit.   and Patient to call EMS/911 and follow protocol based instructions. Follow System Ambulatory Workflow for COVID 19.     Bring Your Own Device:  Please also bring your smart device(s) (smart phones, tablets, laptops) and their charging cables for your personal use and to communicate with your care team  during your visit.      Thank you for limiting contact with others, wearing a simple mask to cover your cough, practice good hand hygiene habits and accessing our virtual services where possible to limit the spread of this virus.    For more information about COVID19 and options for caring for yourself at home, please visit the CDC website at https://www.cdc.gov/coronavirus/2019-ncov/about/steps-when-sick.html  For more options for care at [facility], please visit our website at [email or web address]    For more information, please use the [place] Department of Health COVID-19 Website: https://www.health.[facility].[place]/diseases/coronavirus/index.html  [place] Department of Health ([facility]) COVID-19 Hotlines (Interpreters available):      Health questions: Phone Number: [phone] or [phone] and Hours: 7 a.m. to 7 p.m.    Schools and  questions: Phone Number: [phone] or [phone] and Hours 7 a.m. to 7 p.m.

## 2021-06-08 NOTE — PROGRESS NOTES
"Gume Hauser is a 44 y.o. male who is being evaluated via a billable telephone visit.      The patient has been notified of following:     \"This telephone visit will be conducted via a call between you and your physician/provider. We have found that certain health care needs can be provided without the need for a physical exam.  This service lets us provide the care you need with a short phone conversation.  If a prescription is necessary we can send it directly to your pharmacy.  If lab work is needed we can place an order for that and you can then stop by our lab to have the test done at a later time.    Telephone visits are billed at different rates depending on your insurance coverage. During this emergency period, for some insurers they may be billed the same as an in-person visit.  Please reach out to your insurance provider with any questions.    If during the course of the call the physician/provider feels a telephone visit is not appropriate, you will not be charged for this service.\"    Patient has given verbal consent to a Telephone visit? Yes    What phone number would you like to be contacted at? [phone]    Patient would like to receive their AVS by mail    Additional provider notes:     44-year-old male following up via phone visit today for low back pain is been present for approximately 4 days.  The pain is located in his \"and \"tailbone.  It is worse when he bends over.  He denies any heavy lifting.  He denies the pain increasing he describes it as a thr at work which does not pain.  He is tried using a warm heating pad which helps.  He does a significant amount of walking which does not make the pain worse.    Patient's medical history is significant for hypertension recent chest pain.  He was evaluated at [facility] 1 week ago.  Test results including an echo stress test were found to be unremarkable he has not had a recurrence of chest pain.    Assessment/Plan:  1. " Coccydynia  Starting Voltaren for the patient he understands how to take the medication he will use a warm pack and an ice pack he will continue to keep active as her symptoms remain the same up till next week he will call me again  - diclofenac (VOLTAREN) 75 MG EC tablet; Take 1 tablet (75 mg total) by mouth 2 (two) times a day.  Dispense: 40 tablet; Refill: 0    2. Essential hypertension  Blood pressures been well controlled he is been taking his amlodipine faithfully no dizziness has been noted.    3. Chest wall pain  He was advised to take an aspirin daily.  He was found to have slightly elevated cholesterol.  Troponin test was negative all lab tests including medical studies and the echo stress were discussed with the patient today.        Phone call duration:  25 minutes    Ketan an md

## 2021-06-09 NOTE — PROGRESS NOTES
Subjective:   Gume presents because of some increased voiding at nighttime.  He really does not have too much problem during the day.  He has noticed a little bit of decrease in the strength of his stream.  He denies dysuria.  He denies any change in character or color of the urine.  He has had symptoms over the last 2 months or so.      Objective:  Abdomen: Abdomen is soft and nontender.  Genitalia: No masses noted.  Testicles are normal and scrotum.  No urethral irritation noted.  Rectal: Prostate is enlarged symmetrically.  Is nontender.      Assessment:  1.  BPH      Plan:  We will start tamsulosin 0.4 mg daily.  I instructed the patient in use of this.  If this is of no benefit he will see the urologist for further evaluation.  Diagnostic PSA and urinalysis is done today.

## 2021-06-10 NOTE — PROGRESS NOTES
"Gume Hauser is a 45 y.o. male who is being evaluated via a billable telephone visit.      The patient has been notified of following:     \"This telephone visit will be conducted via a call between you and your physician/provider. We have found that certain health care needs can be provided without the need for a physical exam.  This service lets us provide the care you need with a short phone conversation.  If a prescription is necessary we can send it directly to your pharmacy.  If lab work is needed we can place an order for that and you can then stop by our lab to have the test done at a later time.    Telephone visits are billed at different rates depending on your insurance coverage. During this emergency period, for some insurers they may be billed the same as an in-person visit.  Please reach out to your insurance provider with any questions.    If during the course of the call the physician/provider feels a telephone visit is not appropriate, you will not be charged for this service.\"    Patient has given verbal consent to a Telephone visit? Yes    What phone number would you like to be contacted at? [phone]    Patient would like to receive their AVS by AVS Preference: Mail a copy.    Additional provider notes:     45-year-old male following up for hypertension and episodes of perceived tachycardia.  Patient reports that on 3 separate occasions she is noticed that his heart is beating fast.  He states that this is not related to exertion and he feels a slight headache when the episode occurs.  He has had 3 episodes as mentioned that have occurred within the last 2 weeks.  He also has evidence of this because his smart watch registers an elevated pulse.    The first time he noticed  He was at a friend's house standing in the shade he had had 1 red bull drink and noticed this feeling that lasted for about 10 minutes.  He denies any associated chest pain, shortness of breath, sweating or " dizziness.    The second episode occurred when he was starting to mow his grass he had filled the tank and has not started the actual grass cutting but it was very hot and again noticed the symptoms he sat down and it took about 10 minutes for it to pass.    The third time occurred when he had bent down to help his son with the grass more on another occasion but was not involved in any physical activity.    He denies any recent shortness of breath weight change cough fever or diarrhea.      He recently had a work-up for chest pain done at [facility] which included negative troponins and an echo stress test which revealed an EF of 62%.  No abnormalities were noted on the exam.  Previously in 2016 he had another echo stress test which revealed an EF of 59%.    He does not smoke drinks minimal alcohol and is been taking his amlodipine daily  Assessment/Plan:  1. Tachycardia  Patient's noticed 3 episodes of tachycardia which lasted between 10 to 15 minutes at a time they are not related with exertion but seem to related to the weather.  Otherwise the patient has not noticed any symptoms.  Because of his previous cardiac testing will attempt to capture this event or symptoms with a Holter monitor.  - Holter Monitor; Future    Phone start time 12:01 PM  Phone end time 12:23 PM    Phone call duration:  22 minutes    Ketan Patel MD   Continue statin Continue statin

## 2021-06-12 NOTE — PROGRESS NOTES
Subjective:   Rolando comes in today complaining of right-sided back pain.  The pain will radiate to the left side at times as well.  He has been having a problem with blood in his urine and so he is worried that this may be his kidneys.  He does have an appointment with the urologist but it is not for another 4 weeks.  He denies dysuria but he has had a little more frequency of urination.  He denies any injury to the back.  He thought at first he might have slept wrong on his back because he woke up in the morning with pain.  The pain seems to be mostly in the low back.  He denies any fevers, sweats or chills of any type.  He has no nausea or vomiting.  There is been no bowel change of any type.      Objective:  Lungs: Lungs today are totally clear.  No rales or wheezes heard.  Patient's in no respiratory distress.  Cardiac: There is a regular rhythm present.  No murmur heard.  Abdomen: Abdomen appears soft and nontender.  No suprapubic tenderness noted.  Bowel sounds are active throughout.  Back: No CVA tenderness is noted.  There is mild palpable tenderness in the right sacroiliac area.  Straight leg raising today was negative.  Neurologic: Motor and sensory exams normal.  Patient's gait was normal today.      Assessment:  1.  Low back pain consistent with musculoskeletal pain  2.  Urinary frequency no evidence of infection      Plan:  Urinalysis was done today.  This shows no obvious infection.  It continues to show some blood in the urine.  The patient will keep his appointment with urology to evaluate the hematuria.  He will drink extra liquids.  I gave him stretching exercises for his back.  He can use Tylenol or Advil for pain control.  Icing the back should help pain as well.  Follow-up here if symptoms would persist or worsen.

## 2021-06-14 NOTE — TELEPHONE ENCOUNTER
Refill Request  Did you contact pharmacy: Yes  Medication name:   Requested Prescriptions     Pending Prescriptions Disp Refills     amLODIPine (NORVASC) 10 MG tablet 90 tablet 0     Sig: Take 1 tablet (10 mg total) by mouth daily.     Who prescribed the medication: Dr. Patel   [facility]  Is patient out of medication: Unknown  Patient notified refills processed in 3 business days:  yes  Okay to leave a detailed message: no

## 2021-06-14 NOTE — TELEPHONE ENCOUNTER
Last office visit: 05/29/2020  Last ordered: 07/28/2020 #90, R-0  Last lab check: BMP 08/28/20  Next appointment: None    Chart reviewed. Please review findings below.     2. Essential hypertension  Blood pressures been well controlled he is been taking his amlodipine faithfully no dizziness has been noted.

## 2021-06-17 NOTE — TELEPHONE ENCOUNTER
Refill Approved    Rx renewed per Medication Renewal Policy. Medication was last renewed on 1/21/21.    Vinay Galloway, [facility] Triage/Med Refill 4/26/2021     Requested Prescriptions   Pending Prescriptions Disp Refills     amLODIPine (NORVASC) 10 MG tablet [Pharmacy Med Name: AMLODIPINE BESYLATE 10MG TABLETS] 90 tablet 0     Sig: TAKE 1 TABLET(10 MG) BY MOUTH DAILY       Calcium-Channel Blockers Protocol Passed - 4/25/2021 10:59 AM        Passed - PCP or prescribing provider visit in past 12 months or next 3 months     Last office visit with prescriber/PCP: 5/23/2019 Ketan Patel MD OR same dept: Visit date not found OR same specialty: 5/23/2019 Ketan Patel MD  Last physical: Visit date not found Last MTM visit: Visit date not found   Next visit within 3 mo: Visit date not found  Next physical within 3 mo: Visit date not found  Prescriber OR PCP: Ketan Patel MD  Last diagnosis associated with med order: 1. Essential hypertension  - amLODIPine (NORVASC) 10 MG tablet [Pharmacy Med Name: AMLODIPINE BESYLATE 10MG TABLETS]; TAKE 1 TABLET(10 MG) BY MOUTH DAILY  Dispense: 90 tablet; Refill: 0    If protocol passes may refill for 12 months if within 3 months of last provider visit (or a total of 15 months).             Passed - Blood pressure filed in past 12 months     BP Readings from Last 1 Encounters:   08/28/20 (!) 150/95

## 2021-06-17 NOTE — TELEPHONE ENCOUNTER
Reason for Call:  Switch pharmacy    Do you use a [facility]?  Name of the pharmacy and phone number for the current request: [facility] on [address] Please remove [facility] from chart. His new insurance does not cover [facility].    Name of the medication requested: amLODIPine (NORVASC) 10 MG tablet    Other request: n/a    Can we leave a detailed message on this number? Yes    Phone number patient can be reached at:   Cell number on file:    Telephone Information:   Mobile [phone]       Best Time: anytime    Call taken on 5/6/2021 at 11:09 AM by Armando Yee

## 2021-06-17 NOTE — PROGRESS NOTES
OUTPATIENT VISIT NOTE                                                   Date of Visit: 5/19/2021     Chief Complaint   Chief Complaint   Patient presents with     Fever     up to 102 for 5 days      Cough         History of Present Illness   Gume Hauser is a 45 y.o. male c/o fever to 102 for the last five days with cough that started yesterday.  Mild shortness of breath.  No other symptoms.  Had a negative covid test yesterday.  No known exposures.  Has been taking tylenol as needed.  Has missed work.       MEDICATIONS   Current Outpatient Medications on File Prior to Visit   Medication Sig Dispense Refill     amLODIPine (NORVASC) 10 MG tablet Take 1 tablet (10 mg total) by mouth daily. 90 tablet 3     aspirin 81 MG EC tablet Take 81 mg by mouth daily.       diclofenac (VOLTAREN) 75 MG EC tablet Take 1 tablet (75 mg total) by mouth 2 (two) times a day. 40 tablet 0     No current facility-administered medications on file prior to visit.          SOCIAL HISTORY   Social History     Tobacco Use     Smoking status: Never Smoker     Smokeless tobacco: Never Used   Substance Use Topics     Alcohol use: Not on file           Physical Exam   Vitals:    05/19/21 1550   BP: 122/60   Pulse: (!) 101   Resp: 14   Temp: (!) 101.9  F (38.8  C)   TempSrc: Oral   SpO2: 93%        GENERAL:   Alert. Oriented.  EYES: Clear  HENT:  Ears: R TM pearly gray. Normal landmarks. L TM pearly gray.  Normal landmarks  Nose: Clear.  Sinuses: Nontender.  Oropharynx:  No erythema. No exudate.  NECK: Supple. No adenopathy.  LUNGS: Clear to ascultation.  No crackles.  No wheezing  HEART: RRR  SKIN:  No rash.        Diagnostic Studies   LABS:  Results for orders placed or performed in visit on 05/19/21   Rapid Strep A Screen- Throat Swab    Specimen: Throat   Result Value Ref Range    Rapid Strep A Antigen Group A Strep detected (!) No Group A Strep detected, presumptive negative            Assessment and Plan   1. Strep throat  Rapid Strep A  Screen- Throat Swab    amoxicillin (AMOXIL) 500 MG tablet         Antibiotic as directed.  Throat sprays or lozenges as needed.  Tylenol or Ibuprofen as needed.  Good fluid intake.  Discussed contagiousness.  F/U if worsening or not improving.        Discussed signs / symptoms that warrant urgent / emergent medical attention.     Recheck if worsening or not improving.       Stefan Govea MD        Pertinent History     The following portions of the patient's history were reviewed and updated as appropriate: allergies, current medications, past family history, past medical history, past social history, past surgical history and problem list.

## 2021-06-19 NOTE — PROGRESS NOTES
Assessment:      Healthy male exam.   Hypertension  History of microscopic hematuria  Chest pain of questionable etiology.  Most consistent with chest wall pain.     Plan:       Routine labs will be done today.  Patient will be called with any abnormalities.  He will monitor his chest pains.  He will try ibuprofen if pains recur.  Stay aerobically active.  Follow a good well-balanced diet.  He will continue to try to lose some weight to lower his risk for cardiac disease.     Subjective:      Gume Hauser is a 43 y.o. male who presents for an annual exam. The patient reports that there is not domestic violence in his life.  Overall, he has been feeling well.  He had a recent episode of left-sided chest pain.  It was fairly sharp in nature.  It was in the left lower chest area.  It lasted about 3 days.  It was not associated with shortness of breath or diaphoresis.  He had no associated nausea.  He denied palpitations during this time.  He does not remember any injury to the chest wall.  Movement and pressing the area would make the pain worse.  He had no associated heartburn or nausea.  He has been trying to eat better.  He is exercising.  He has lost some weight recently.    Healthy Habits:   Regular Exercise: Yes  Sunscreen Use: No  Healthy Diet: Yes  Dental Visits Regularly: Yes  Seat Belt: Yes  Sexually active: Yes  Monthly Self Testicular Exams:  No  Hemoccults: No  Flex Sig: No  Colonoscopy: No  Lipid Profile: Yes  Glucose Screen: Yes        Immunization History   Administered Date(s) Administered     DT (pediatric) 01/12/2004     Td,adult,historic,unspecified 01/12/2004     Tdap 01/21/2016     Immunization status: up to date and documented.    No exam data present     Current Outpatient Prescriptions   Medication Sig Dispense Refill     amLODIPine (NORVASC) 10 MG tablet TAKE 1 TABLET BY MOUTH EVERY DAY 90 tablet 3     tamsulosin (FLOMAX) 0.4 mg Cp24 Take 1 capsule (0.4 mg total) by mouth daily. 30  "capsule 5     No current facility-administered medications for this visit.      No past medical history on file.  No past surgical history on file.  Review of patient's allergies indicates no known allergies.  Family History   Problem Relation Age of Onset     Hypertension Father      Social History     Social History     Marital status: Single     Spouse name: N/A     Number of children: N/A     Years of education: N/A     Occupational History     Not on file.     Social History Main Topics     Smoking status: Never Smoker     Smokeless tobacco: Never Used     Alcohol use Not on file     Drug use: Not on file     Sexual activity: Not on file     Other Topics Concern     Not on file     Social History Narrative       Review of Systems  Review of Systems   Constitutional: Negative.  Negative for fatigue and fever.   HENT: Negative.  Negative for congestion.    Eyes: Negative.    Respiratory: Negative.  Negative for cough and shortness of breath.    Cardiovascular:        Complains of recent left-sided chest pain.  This was present for approximately 3 days.  It now has resolved.  It was not associated with any other shortness of breath or palpitations of any type.  He does not remember any injury.   Gastrointestinal: Negative.  Negative for constipation and diarrhea.   Endocrine: Negative.    Genitourinary: Negative.    Musculoskeletal: Negative.    Skin: Negative.    Allergic/Immunologic: Negative.    Neurological: Negative.    Hematological: Negative.    Psychiatric/Behavioral: Negative.              Objective:     Vitals:    07/30/18 1951   BP: 128/80   Pulse: 84   Resp: 16   Temp: 98.8  F (37.1  C)   TempSrc: Oral   SpO2: 97%   Weight: 203 lb (92.1 kg)   Height: 5' 7\" (1.702 m)     Body mass index is 31.79 kg/(m^2).    Physical  Physical Exam   Constitutional: He is oriented to person, place, and time. He appears well-developed and well-nourished. No distress.   HENT:   Right Ear: External ear normal.   Left Ear: " External ear normal.   Nose: Nose normal.   Mouth/Throat: Oropharynx is clear and moist.   Eyes: Conjunctivae and EOM are normal.   Neck: Normal range of motion. Neck supple. No JVD present. No thyromegaly present.   Cardiovascular: Normal rate, regular rhythm and normal heart sounds.    Pulmonary/Chest: Effort normal and breath sounds normal. No respiratory distress. He exhibits no tenderness.   Abdominal: Soft. Bowel sounds are normal. There is no tenderness.   Genitourinary: Penis normal.   Genitourinary Comments: No hernia present.   Musculoskeletal: Normal range of motion. He exhibits no edema or tenderness.   Lymphadenopathy:     He has no cervical adenopathy.   Neurological: He is alert and oriented to person, place, and time. He has normal reflexes. No cranial nerve deficit.   Skin: Skin is warm and dry. No rash noted.   Psychiatric: He has a normal mood and affect.

## 2021-06-21 NOTE — LETTER
Letter by Stefan Govea MD at      Author: Stefan Govea MD Service: -- Author Type: --    Filed:  Encounter Date: 5/19/2021 Status: (Other)         May 19, 2021     Patient: Gume Hauser   YOB: 1975   Date of Visit: 5/19/2021       To Whom it May Concern:    Gume Hauser was seen in my clinic on 5/19/2021.        Sincerely,         Electronically signed by Stefan Govea MD

## 2021-06-23 NOTE — TELEPHONE ENCOUNTER
Please call patient and have him re-establish care with another provider. I have refilled his blood pressure medication.

## 2021-06-23 NOTE — TELEPHONE ENCOUNTER
Former patient of Padmaja & has not established care with another provider.  Please assign refill request to covering provider per Clinic standard process.      Refill Approved    Rx renewed per Medication Renewal Policy. Medication was last renewed on 11/11/17.    Ly Mares, [facility] Triage/Med Refill 1/12/2019     Requested Prescriptions   Pending Prescriptions Disp Refills     amLODIPine (NORVASC) 10 MG tablet [Pharmacy Med Name: AMLODIPINE BESYLATE 10MG TABLETS] 90 tablet 0     Sig: TAKE 1 TABLET BY MOUTH EVERY DAY    Calcium-Channel Blockers Protocol Passed - 1/11/2019  6:31 PM       Passed - PCP or prescribing provider visit in past 12 months or next 3 months    Last office visit with prescriber/PCP: 7/30/2018 Tej Giang MD OR same dept: 7/30/2018 Tej Giang MD OR same specialty: 7/30/2018 Tej Giang MD  Last physical: Visit date not found Last MTM visit: Visit date not found   Next visit within 3 mo: Visit date not found  Next physical within 3 mo: Visit date not found  Prescriber OR PCP: Tej Giang MD  Last diagnosis associated with med order: There are no diagnoses linked to this encounter.  If protocol passes may refill for 12 months if within 3 months of last provider visit (or a total of 15 months).            Passed - Blood pressure filed in past 12 months    BP Readings from Last 1 Encounters:   07/30/18 128/80

## 2021-06-23 NOTE — PROGRESS NOTES
"ASSESSMENT AND PLAN:  1. Encounter to establish care  Patient had seen In the past.  Establishing Care with Me Today.  Reports That He Stopped Smoking Faithfully No Has No Acute Questions.    2. Essential hypertension  Blood pressures well controlled continue amlodipine at current dose discussed goals for weight loss which would be directly influencing.  We will return to the clinic for a follow-up blood draw at the end of the summer.            No orders of the defined types were placed in this encounter.    Medications Discontinued During This Encounter   Medication Reason     tamsulosin (FLOMAX) 0.4 mg Cp24 Therapy completed       No Follow-up on file.    CHIEF COMPLAINT:  Chief Complaint   Patient presents with     Establish Care       HISTORY OF PRESENT ILLNESS:  Gume, \"Rolando\", is a 43 y.o. male with hypertension who presents to the clinic today to establish care. He was previously saw Dr. Giang who he last saw for physical on 07/30/2018. Labs at the time showed normal BMP, lipids, CBC, and TSH, with the exception of elevated triglycerides. He is doing well overall, and continues to do well on amlodipine for management of hypertension. His blood pressure is well-controlled. His weight remains stable between 198 and 210 pounds with some fluctuation during the holidays. The patient does a significant amount of walking for work. He declines influenza immunization at this time.    REVIEW OF SYSTEMS:    Pertinent positives as above in HPI. All other systems are negative.    PFSH:  Reviewed as below.     TOBACCO USE:  Social History     Tobacco Use   Smoking Status Never Smoker   Smokeless Tobacco Never Used       VITALS:  Vitals:    02/05/19 1058   BP: 130/86   Patient Site: Right Arm   Patient Position: Sitting   Cuff Size: Adult Large   Pulse: 74   Resp: 16   Temp: 98.7  F (37.1  C)   TempSrc: Oral   SpO2: 95%   Weight: 203 lb (92.1 kg)   Height: 5' 7\" (1.702 m)     Wt Readings from Last 3 Encounters: "   02/05/19 203 lb (92.1 kg)   07/30/18 203 lb (92.1 kg)   08/29/17 202 lb 4 oz (91.7 kg)     Body mass index is 31.79 kg/m .    PHYSICAL EXAM:  General: Alert, cooperative, no distress, appears stated age  HEENT: Normocephalic, without obvious abnormality, atraumatic, moist mucous membranes  Lungs: Clear to auscultation bilaterally, respirations unlabored  Heart: Regular rate and rhythm, S1 and S2 normal, no murmur, rub, or gallop  Neurologic:  A & O x 3.  No tremor, no focal findings.  Normal gait.     DATA REVIEWED:  Additional History from Old Records Summarized (2): Reviewed Dr. Giang's note from 07/30/2018 regarding physical exam.  Decision to Obtain Records (1): none  Radiology Tests Summarized or Ordered (1): none  Labs Reviewed or Ordered (1): Labs 07/30/2018 showed normal BMP, lipids, CBC, and TSH, with the exception of elevated triglycerides.  Medicine Test Summarized or Ordered (1): none  Independent Review of EKG or X-RAY(2 each): none    Terra SANCHEZ, am scribing for and in the presence of, Dr. Patel.    IDr. Patel, personally performed the services described in this documentation, as scribed by Terra Kwan in my presence, and it is both accurate and complete.      MEDICATIONS:  Current Outpatient Medications   Medication Sig Dispense Refill     amLODIPine (NORVASC) 10 MG tablet TAKE 1 TABLET BY MOUTH EVERY DAY 90 tablet 0     No current facility-administered medications for this visit.        Total Data Points: 3    Please note that this clinical encounter uses voice recognition software, there may be typographical errors present

## 2021-06-26 ENCOUNTER — HEALTH MAINTENANCE LETTER (OUTPATIENT)
Age: 46
End: 2021-06-26

## 2021-07-02 ENCOUNTER — COMMUNICATION - HEALTHEAST (OUTPATIENT)
Dept: ADMINISTRATIVE | Facility: CLINIC | Age: 46
End: 2021-07-02

## 2021-07-02 DIAGNOSIS — I10 ESSENTIAL HYPERTENSION: ICD-10-CM

## 2021-07-04 NOTE — TELEPHONE ENCOUNTER
Telephone Encounter by Heraclio Martinez CMA at 7/2/2021  2:21 PM     Author: Heraclio Martinez CMA Service: -- Author Type: Certified Medical Assistant    Filed: 7/2/2021  2:22 PM Encounter Date: 7/2/2021 Status: Signed    : Heraclio Martinez CMA (Certified Medical Assistant)       Reason for Call:  Other returning call      Detailed comments: Regarding amLODIPine (NORVASC) 10 MG tablet. The patient called [facility]. [facility] told the patient that they did not receive the 05/07/21 order for #90, R-3. Pharmacy confirmed this.     [facility] phoned in order for amLODIPine (NORVASC) 10 MG tablet. Patient notified. Thanks.     Phone Number Patient can be reached at: Cell number on file:    Telephone Information:   Mobile [phone]       Best Time: N/A    Can we leave a detailed message on this number?: No call back needed    Call taken on 7/2/2021 at 2:21 PM by Heraclio Martinez

## 2021-07-04 NOTE — TELEPHONE ENCOUNTER
Telephone Encounter by Armando Yee at 7/2/2021  1:47 PM     Author: Armando Yee Service: -- Author Type: Patient Access    Filed: 7/2/2021  1:50 PM Encounter Date: 7/2/2021 Status: Signed    : Armando Yee (Patient Access)       Reason for Call:  Medication or medication refill:    Do you use a [facility]?  Name of the pharmacy and phone number for the current request: [id number] - [place], [place] - [address]    Name of the medication requested: amLODIPine (NORVASC) 10 MG tablet    Other request: Pt has 4 pills left. Wondering if Dr. Patel can send in refills today before he leaves at 2?    Can we leave a detailed message on this number? Yes    Phone number patient can be reached at:   Cell number on file:    Telephone Information:   Mobile [phone]       Best Time: anytime    Call taken on 7/2/2021 at 1:48 PM by Armando Yee

## 2021-07-04 NOTE — TELEPHONE ENCOUNTER
Telephone Encounter by Heraclio Martinez CMA at 7/2/2021  2:04 PM     Author: Heraclio Martinez CMA Service: -- Author Type: Certified Medical Assistant    Filed: 7/2/2021  2:07 PM Encounter Date: 7/2/2021 Status: Signed    : Heraclio Martinez CMA (Certified Medical Assistant)       Refill is not necessary as MD sent order for 3 month supply with 3 refills in May. Patient notified.

## 2021-07-06 RX ORDER — AMLODIPINE BESYLATE 10 MG/1
10 TABLET ORAL DAILY
Qty: 90 TABLET | Refills: 3 | Status: SHIPPED | OUTPATIENT
Start: 2021-07-06 | End: 2022-10-28

## 2021-07-06 NOTE — TELEPHONE ENCOUNTER
Telephone Encounter by Winifred Harvey MA at 7/6/2021  3:16 PM     Author: Winifred Harvey MA Service: -- Author Type: Medical Assistant    Filed: 7/6/2021  3:16 PM Encounter Date: 7/2/2021 Status: Signed    : Winifred Harvey MA (Medical Assistant)       Please refill Amlodipine.

## 2021-07-06 NOTE — ADDENDUM NOTE
Addendum Note by Alfonso Ronquillo MD at 7/6/2021  4:51 PM     Author: Alfonso Ronquillo MD Service: -- Author Type: Physician    Filed: 7/6/2021  4:51 PM Encounter Date: 7/2/2021 Status: Signed    : Alfonso Ronquillo MD (Physician)    Addended by: ALFONSO RONQUILLO on: 7/6/2021 04:51 PM        Modules accepted: Orders

## 2021-10-16 ENCOUNTER — HEALTH MAINTENANCE LETTER (OUTPATIENT)
Age: 46
End: 2021-10-16

## 2022-07-17 ENCOUNTER — HEALTH MAINTENANCE LETTER (OUTPATIENT)
Age: 47
End: 2022-07-17

## 2022-09-25 ENCOUNTER — HEALTH MAINTENANCE LETTER (OUTPATIENT)
Age: 47
End: 2022-09-25

## 2022-10-17 ENCOUNTER — TELEPHONE (OUTPATIENT)
Dept: FAMILY MEDICINE | Facility: CLINIC | Age: 47
End: 2022-10-17

## 2022-10-17 NOTE — TELEPHONE ENCOUNTER
Blood in semen a week and a half ago and mid lower back pain. Pt requests appt with either Dr Patel or other [facility] provider asap.    [phone] is the best number to reach him.

## 2022-10-17 NOTE — TELEPHONE ENCOUNTER
Called pt to assess if he needs to be seen sooner. Pt stated he saw a small blood blot with semen a week and a half ago. No sexual intercourse since then. His urine are clear and no pain with it. The lower back pain is not getting worse or getting better either. Pt will wait until appt to be seen by PCP. RN reccommended pt go to UC or ED if symptoms get worse.    Lino Curtis Cem Say, BSN RN  [facility]

## 2022-10-28 ENCOUNTER — ANCILLARY PROCEDURE (OUTPATIENT)
Dept: GENERAL RADIOLOGY | Facility: CLINIC | Age: 47
End: 2022-10-28
Attending: FAMILY MEDICINE
Payer: MEDICAID

## 2022-10-28 ENCOUNTER — OFFICE VISIT (OUTPATIENT)
Dept: FAMILY MEDICINE | Facility: CLINIC | Age: 47
End: 2022-10-28
Payer: MEDICAID

## 2022-10-28 VITALS
OXYGEN SATURATION: 96 % | TEMPERATURE: 98.6 F | BODY MASS INDEX: 30.84 KG/M2 | HEIGHT: 67 IN | SYSTOLIC BLOOD PRESSURE: 148 MMHG | HEART RATE: 84 BPM | DIASTOLIC BLOOD PRESSURE: 102 MMHG | WEIGHT: 196.5 LBS

## 2022-10-28 DIAGNOSIS — M54.50 PAIN IN LEFT LUMBAR REGION OF BACK: ICD-10-CM

## 2022-10-28 DIAGNOSIS — R31.29 MICROSCOPIC HEMATURIA: Primary | ICD-10-CM

## 2022-10-28 DIAGNOSIS — Z11.59 NEED FOR HEPATITIS C SCREENING TEST: ICD-10-CM

## 2022-10-28 DIAGNOSIS — I10 ESSENTIAL HYPERTENSION: ICD-10-CM

## 2022-10-28 DIAGNOSIS — I10 PRIMARY HYPERTENSION: ICD-10-CM

## 2022-10-28 DIAGNOSIS — Z11.4 SCREENING FOR HIV (HUMAN IMMUNODEFICIENCY VIRUS): ICD-10-CM

## 2022-10-28 LAB
ALBUMIN SERPL BCG-MCNC: 4.7 G/DL (ref 3.5–5.2)
ALBUMIN UR-MCNC: 100 MG/DL
ALP SERPL-CCNC: 71 U/L (ref 40–129)
ALT SERPL W P-5'-P-CCNC: 12 U/L (ref 10–50)
ANION GAP SERPL CALCULATED.3IONS-SCNC: 10 MMOL/L (ref 7–15)
APPEARANCE UR: CLEAR
AST SERPL W P-5'-P-CCNC: 20 U/L (ref 10–50)
BACTERIA #/AREA URNS HPF: ABNORMAL /HPF
BILIRUB SERPL-MCNC: 0.8 MG/DL
BILIRUB UR QL STRIP: NEGATIVE
BUN SERPL-MCNC: 18 MG/DL (ref 6–20)
CALCIUM SERPL-MCNC: 10.2 MG/DL (ref 8.6–10)
CHLORIDE SERPL-SCNC: 102 MMOL/L (ref 98–107)
CHOLEST SERPL-MCNC: 255 MG/DL
COLOR UR AUTO: YELLOW
CREAT SERPL-MCNC: 1.4 MG/DL (ref 0.67–1.17)
DEPRECATED HCO3 PLAS-SCNC: 27 MMOL/L (ref 22–29)
ERYTHROCYTE [DISTWIDTH] IN BLOOD BY AUTOMATED COUNT: 14.1 % (ref 10–15)
GFR SERPL CREATININE-BSD FRML MDRD: 62 ML/MIN/1.73M2
GLUCOSE SERPL-MCNC: 103 MG/DL (ref 70–99)
GLUCOSE UR STRIP-MCNC: NEGATIVE MG/DL
HCT VFR BLD AUTO: 48.4 % (ref 40–53)
HCV AB SERPL QL IA: NONREACTIVE
HDLC SERPL-MCNC: 52 MG/DL
HGB BLD-MCNC: 15.6 G/DL (ref 13.3–17.7)
HGB UR QL STRIP: ABNORMAL
HIV 1+2 AB+HIV1 P24 AG SERPL QL IA: NONREACTIVE
KETONES UR STRIP-MCNC: ABNORMAL MG/DL
LDLC SERPL CALC-MCNC: 175 MG/DL
LEUKOCYTE ESTERASE UR QL STRIP: NEGATIVE
MCH RBC QN AUTO: 25.7 PG (ref 26.5–33)
MCHC RBC AUTO-ENTMCNC: 32.2 G/DL (ref 31.5–36.5)
MCV RBC AUTO: 80 FL (ref 78–100)
NITRATE UR QL: NEGATIVE
NONHDLC SERPL-MCNC: 203 MG/DL
PH UR STRIP: 6.5 [PH] (ref 5–7)
PLATELET # BLD AUTO: 299 10E3/UL (ref 150–450)
POTASSIUM SERPL-SCNC: 4.3 MMOL/L (ref 3.4–5.3)
PROT SERPL-MCNC: 8.1 G/DL (ref 6.4–8.3)
RBC # BLD AUTO: 6.07 10E6/UL (ref 4.4–5.9)
RBC #/AREA URNS AUTO: ABNORMAL /HPF
SODIUM SERPL-SCNC: 139 MMOL/L (ref 136–145)
SP GR UR STRIP: 1.02 (ref 1–1.03)
SQUAMOUS #/AREA URNS AUTO: ABNORMAL /LPF
TRIGL SERPL-MCNC: 141 MG/DL
UROBILINOGEN UR STRIP-ACNC: 1 E.U./DL
WBC # BLD AUTO: 5.8 10E3/UL (ref 4–11)
WBC #/AREA URNS AUTO: ABNORMAL /HPF
YEAST #/AREA URNS HPF: ABNORMAL /HPF

## 2022-10-28 PROCEDURE — 99214 OFFICE O/P EST MOD 30 MIN: CPT | Performed by: FAMILY MEDICINE

## 2022-10-28 PROCEDURE — 87389 HIV-1 AG W/HIV-1&-2 AB AG IA: CPT | Performed by: FAMILY MEDICINE

## 2022-10-28 PROCEDURE — 36415 COLL VENOUS BLD VENIPUNCTURE: CPT | Performed by: FAMILY MEDICINE

## 2022-10-28 PROCEDURE — 80053 COMPREHEN METABOLIC PANEL: CPT | Performed by: FAMILY MEDICINE

## 2022-10-28 PROCEDURE — 80061 LIPID PANEL: CPT | Performed by: FAMILY MEDICINE

## 2022-10-28 PROCEDURE — 74018 RADEX ABDOMEN 1 VIEW: CPT | Mod: TC | Performed by: RADIOLOGY

## 2022-10-28 PROCEDURE — 85027 COMPLETE CBC AUTOMATED: CPT | Performed by: FAMILY MEDICINE

## 2022-10-28 PROCEDURE — 86803 HEPATITIS C AB TEST: CPT | Performed by: FAMILY MEDICINE

## 2022-10-28 PROCEDURE — 81001 URINALYSIS AUTO W/SCOPE: CPT | Performed by: FAMILY MEDICINE

## 2022-10-28 RX ORDER — AMLODIPINE BESYLATE 10 MG/1
10 TABLET ORAL DAILY
Qty: 90 TABLET | Refills: 3 | Status: SHIPPED | OUTPATIENT
Start: 2022-10-28

## 2022-10-28 ASSESSMENT — ENCOUNTER SYMPTOMS: BACK PAIN: 1

## 2022-10-28 NOTE — PROGRESS NOTES
"  Assessment & Plan         Screening for HIV (human immunodeficiency virus)    Pt agrees for test  - HIV Antigen Antibody Combo; Future    Need for hepatitis C screening test    Patient agrees for test today  - Hepatitis C Screen Reflex to HCV RNA Quant and Genotype; Future    Microscopic hematuria    Blood noted in urine he had microscopic hematuria in the past urology visits it did not determine the cause repeating test today repeating CMP.  - UA Macro with Reflex to Micro and Culture - lab collect; Future  - Comprehensive metabolic panel (BMP + Alb, Alk Phos, ALT, AST, Total. Bili, TP); Future  - UA Macro with Reflex to Micro and Culture - lab collect    Pain in left lumbar region of back    Left lumbar back pain rating to the paraspinous muscles of the left costovertebral angle today's x-ray revealed no evidence of nephrolithiasis  Personally reviewed x-ray today phleboliths present    Obtaining ultrasound patient will have that scheduled for next week  Differential diagnosis could include renal colic, nephrolithiasis.  Also  - XR Abdomen Upright Only; Future  - US Abdomen Complete; Future    Primary hypertension  Checking a lipid panel today he is fasting  - Lipid panel; Future    Essential hypertension    Blood pressure elevated he has been out of his amlodipine and refilling the medication repeating a CMP today.    - Comprehensive metabolic panel (BMP + Alb, Alk Phos, ALT, AST, Total. Bili, TP); Future  - CBC with platelets; Future  - amLODIPine (NORVASC) 10 MG tablet; Take 1 tablet (10 mg) by mouth daily             BMI:   Estimated body mass index is 30.78 kg/m  as calculated from the following:    Height as of this encounter: 1.702 m (5' 7\").    Weight as of this encounter: 89.1 kg (196 lb 8 oz).           No follow-ups on file.    Ketan Patel MD  [facility] KADI Marshall is a 47 year old, presenting for the following health issues:  Back Pain (Mid lower back)  History of " presenting illness  47-year-old male here because he has been having left-sided back pain which he has been intermittent for the last 2 months the pain is a throbbing deep type pain that radiates to the left side of his back.  Not exacerbated by heavy lifting.  Patient is also noted blood in his urine he had this problem approximately 5 years ago and had an extensive work-up which proved to be benign from urology.  The cause of his hematuria is never noted.  2 weeks ago he had testicular discomfort and has not had sex since.  He denies having any testicular pain.  He thinks that he may have had the back pain and concurrent hematuria with 10 days of each other.  He also noticed that during his last sexual encounter with his wife there was blood present.  He denies any weakness headaches and dizziness.  He has a run out of his blood pressure medication about a year ago he notes that from his electric watch his heart rate is elevated when he does minor amounts of exercise notes no chest pain no shortness of breath.  His appetite is described as normal no abdominal pain nausea or vomiting's been noted he is not    Back Pain     History of Present Illness       Back Pain:  He presents for follow up of back pain. Patient's back pain is a new problem.    Original cause of back pain: not sure  First noticed back pain: in the last week  Patient feels back pain: 2 to 4 times per dayLocation of back pain:  Right lower back  Description of back pain: dull ache and other  Back pain spreads: nowhere    Since patient first noticed back pain, pain is: unchanged  Does back pain interfere with his job:  No  On a scale of 1-10 (10 being the worst), patient describes pain as:  3  What makes back pain worse: nothing  Acupuncture: not tried  Acetaminophen: not tried  Activity or exercise: helpful  Chiropractor:  Not tried  Cold: not tried  Heat: helpful  Massage: not tried  Muscle relaxants: not tried  NSAIDS: not tried  Opioids: not  "tried  Physical Therapy: not tried  Rest: helpful  Steroid Injection: not tried  Stretching: not tried  Surgery: not tried  TENS unit: not tried  Topical pain relievers: not tried  Other healthcare providers patient is seeing for back pain: None    He eats 0-1 servings of fruits and vegetables daily.He consumes 1 sweetened beverage(s) daily.He exercises with enough effort to increase his heart rate 20 to 29 minutes per day.  He exercises with enough effort to increase his heart rate 3 or less days per week.   He is taking medications regularly.             Review of Systems   Musculoskeletal: Positive for back pain.      Constitutional, HEENT, cardiovascular, pulmonary, gi and gu systems are negative, except as otherwise noted.      Objective    Ht 1.702 m (5' 7\")   Wt 89.1 kg (196 lb 8 oz)   BMI 30.78 kg/m    Body mass index is 30.78 kg/m .  Physical Exam   GENERAL: healthy, alert and no distress  EYES: Eyes grossly normal to inspection, PERRL and conjunctivae and sclerae normal  HENT: ear canals and TM's normal, nose and mouth without ulcers or lesions  NECK: no adenopathy, no asymmetry, masses, or scars and thyroid normal to palpation  RESP: lungs clear to auscultation - no rales, rhonchi or wheezes  CV: regular rate and rhythm, normal S1 S2, no S3 or S4, no murmur, click or rub, no peripheral edema and peripheral pulses strong  ABDOMEN: soft, nontender, no hepatosplenomegaly, no masses and bowel sounds normal   (male): normal male genitalia without lesions or urethral discharge, no hernia  MS:   There is notable left CVA, there is tenderness through left lumbar paraspinous muscles are tender to the touch.  Forward flexion extension cause no discomfort.  SKIN: no suspicious lesions or rashes  NEURO: Normal strength and tone, mentation intact and speech normal  PSYCH: mentation appears normal, affect normal/bright                "

## 2022-10-28 NOTE — RESULT ENCOUNTER NOTE
Please inform patient that there is no evidence of any kidney stones.  Please inform patient that he does have blood in urine

## 2022-10-30 ENCOUNTER — HOSPITAL ENCOUNTER (OUTPATIENT)
Dept: ULTRASOUND IMAGING | Facility: HOSPITAL | Age: 47
Discharge: HOME | End: 2022-10-30
Attending: FAMILY MEDICINE | Admitting: FAMILY MEDICINE
Payer: MEDICAID

## 2022-10-30 DIAGNOSIS — M54.50 PAIN IN LEFT LUMBAR REGION OF BACK: ICD-10-CM

## 2022-10-30 PROCEDURE — 76700 US EXAM ABDOM COMPLETE: CPT

## 2022-10-31 ENCOUNTER — TELEPHONE (OUTPATIENT)
Dept: FAMILY MEDICINE | Facility: CLINIC | Age: 47
End: 2022-10-31

## 2022-10-31 NOTE — TELEPHONE ENCOUNTER
Clinic RN reached out to patient to relay test results from visit on 10/28/2022.  Patient question further US from 10/30/2022.  RN advised patient provider has not had a chance to review and  this result.  Provider will reach out to patient with result and recommendation after reviewed.  Patient understood.     DANE Vega, RN  [facility]

## 2022-11-01 NOTE — RESULT ENCOUNTER NOTE
Please inform patient that he does have a simple cyst in the liver which requires no follow-up he has small cysts in the kidney on the right and left side with no swelling of the kidney no kidney stones noted.  He does have some polyps of the gallbladder but no gallbladder stones.  There is no evidence of any other masses in his abdominal area.  He continues to have pain I would like to refer him to gastroenterology

## 2022-11-01 NOTE — TELEPHONE ENCOUNTER
Reason for Call:  Request for results    Name of test or procedure: ULTRASOUND ABDOMEN COMPLETE    Date of test of procedure: 10/30/2022    Location of the test or procedure: [facility]    OK to leave the result message on voice mail or with a family member? NO    Phone number Patient can be reached at:  Cell number on file:    Telephone Information:   Mobile [phone]       Additional comments: Patient calling to request the results for Abdominal US.     Call taken on 11/1/2022 at 10:02 AM by Heraclio Martinez

## 2022-11-01 NOTE — RESULT ENCOUNTER NOTE
Please let the patient know that his total cholesterol was slightly elevated his hepatitis C test were normal his kidney function was slightly elevated his electrolytes were normal his liver function tests were normal his HIV test was negative

## 2023-08-06 ENCOUNTER — HEALTH MAINTENANCE LETTER (OUTPATIENT)
Age: 48
End: 2023-08-06

## 2023-12-06 ENCOUNTER — TELEPHONE (OUTPATIENT)
Dept: FAMILY MEDICINE | Facility: CLINIC | Age: 48
End: 2023-12-06
Payer: MEDICAID

## 2023-12-06 NOTE — TELEPHONE ENCOUNTER
Patient Quality Outreach    Patient is due for the following:   Hypertension -  BP check and Hypertension follow-up visit    Next Steps:   Patient declined follow up at this time.    Type of outreach:    Phone, spoke to patient/parent. Patient/parent will call back to schedule.  Patient reported has no active insurance to cover provider visit.  RN offered BP check with nursing only for free of charge. Patient will call in to schedule when figures out work schedule.  Currently not taking Amlodipine medication prescribed.    Next Steps:  Reach out within 90 days via Phone.    Max number of attempts reached: No. Will try again in 90 days if patient still on fail list.    Questions for provider review:    None           Cory Coelho RN

## 2024-09-28 ENCOUNTER — HEALTH MAINTENANCE LETTER (OUTPATIENT)
Age: 49
End: 2024-09-28